# Patient Record
Sex: FEMALE | NOT HISPANIC OR LATINO | Employment: UNEMPLOYED | URBAN - METROPOLITAN AREA
[De-identification: names, ages, dates, MRNs, and addresses within clinical notes are randomized per-mention and may not be internally consistent; named-entity substitution may affect disease eponyms.]

---

## 2022-11-25 ENCOUNTER — ANESTHESIA (OUTPATIENT)
Dept: SURGERY | Facility: CLINIC | Age: 32
End: 2022-11-25
Payer: COMMERCIAL

## 2022-11-25 ENCOUNTER — ANESTHESIA EVENT (OUTPATIENT)
Dept: SURGERY | Facility: CLINIC | Age: 32
End: 2022-11-25
Payer: COMMERCIAL

## 2022-11-25 ENCOUNTER — HOSPITAL ENCOUNTER (INPATIENT)
Facility: CLINIC | Age: 32
LOS: 4 days | Discharge: HOME | End: 2022-11-29
Attending: STUDENT IN AN ORGANIZED HEALTH CARE EDUCATION/TRAINING PROGRAM | Admitting: STUDENT IN AN ORGANIZED HEALTH CARE EDUCATION/TRAINING PROGRAM
Payer: COMMERCIAL

## 2022-11-25 DIAGNOSIS — O44.03 PLACENTA PREVIA IN THIRD TRIMESTER: Primary | ICD-10-CM

## 2022-11-25 PROBLEM — O44.00 PLACENTA PREVIA: Status: ACTIVE | Noted: 2022-11-25

## 2022-11-25 LAB
ABO/RH(D): NORMAL
AMPHETAMINES UR QL SCN: ABNORMAL
AMPHETAMINES UR QL SCN: ABNORMAL
ANTIBODY SCREEN: NEGATIVE
BARBITURATES UR QL: ABNORMAL
BENZODIAZ UR QL: ABNORMAL
CANNABINOIDS UR QL SCN: ABNORMAL
CANNABINOIDS UR QL SCN: ABNORMAL
COCAINE UR QL: ABNORMAL
COCAINE UR QL: ABNORMAL
CREAT UR-MCNC: 178 MG/DL
ERYTHROCYTE [DISTWIDTH] IN BLOOD BY AUTOMATED COUNT: 12.8 % (ref 10–15)
HCT VFR BLD AUTO: 29.2 % (ref 35–47)
HGB BLD-MCNC: 9.6 G/DL (ref 11.7–15.7)
MCH RBC QN AUTO: 27.7 PG (ref 26.5–33)
MCHC RBC AUTO-ENTMCNC: 32.9 G/DL (ref 31.5–36.5)
MCV RBC AUTO: 84 FL (ref 78–100)
OPIATES UR QL SCN: ABNORMAL
OPIATES UR QL SCN: ABNORMAL
PCP UR QL SCN: ABNORMAL
PCP UR QL SCN: ABNORMAL
PLATELET # BLD AUTO: 191 10E3/UL (ref 150–450)
RBC # BLD AUTO: 3.46 10E6/UL (ref 3.8–5.2)
SARS-COV-2 RNA RESP QL NAA+PROBE: NEGATIVE
SPECIMEN EXPIRATION DATE: NORMAL
T PALLIDUM AB SER QL: NONREACTIVE
WBC # BLD AUTO: 7.5 10E3/UL (ref 4–11)

## 2022-11-25 PROCEDURE — 59025 FETAL NON-STRESS TEST: CPT

## 2022-11-25 PROCEDURE — 250N000011 HC RX IP 250 OP 636

## 2022-11-25 PROCEDURE — 370N000017 HC ANESTHESIA TECHNICAL FEE, PER MIN: Performed by: STUDENT IN AN ORGANIZED HEALTH CARE EDUCATION/TRAINING PROGRAM

## 2022-11-25 PROCEDURE — 86901 BLOOD TYPING SEROLOGIC RH(D): CPT | Performed by: STUDENT IN AN ORGANIZED HEALTH CARE EDUCATION/TRAINING PROGRAM

## 2022-11-25 PROCEDURE — 250N000011 HC RX IP 250 OP 636: Performed by: STUDENT IN AN ORGANIZED HEALTH CARE EDUCATION/TRAINING PROGRAM

## 2022-11-25 PROCEDURE — U0003 INFECTIOUS AGENT DETECTION BY NUCLEIC ACID (DNA OR RNA); SEVERE ACUTE RESPIRATORY SYNDROME CORONAVIRUS 2 (SARS-COV-2) (CORONAVIRUS DISEASE [COVID-19]), AMPLIFIED PROBE TECHNIQUE, MAKING USE OF HIGH THROUGHPUT TECHNOLOGIES AS DESCRIBED BY CMS-2020-01-R: HCPCS | Performed by: STUDENT IN AN ORGANIZED HEALTH CARE EDUCATION/TRAINING PROGRAM

## 2022-11-25 PROCEDURE — 360N000076 HC SURGERY LEVEL 3, PER MIN: Performed by: STUDENT IN AN ORGANIZED HEALTH CARE EDUCATION/TRAINING PROGRAM

## 2022-11-25 PROCEDURE — G0463 HOSPITAL OUTPT CLINIC VISIT: HCPCS | Mod: 25

## 2022-11-25 PROCEDURE — 86780 TREPONEMA PALLIDUM: CPT | Performed by: STUDENT IN AN ORGANIZED HEALTH CARE EDUCATION/TRAINING PROGRAM

## 2022-11-25 PROCEDURE — 80349 CANNABINOIDS NATURAL: CPT | Performed by: STUDENT IN AN ORGANIZED HEALTH CARE EDUCATION/TRAINING PROGRAM

## 2022-11-25 PROCEDURE — 250N000013 HC RX MED GY IP 250 OP 250 PS 637: Performed by: STUDENT IN AN ORGANIZED HEALTH CARE EDUCATION/TRAINING PROGRAM

## 2022-11-25 PROCEDURE — 250N000011 HC RX IP 250 OP 636: Performed by: ANESTHESIOLOGY

## 2022-11-25 PROCEDURE — 85027 COMPLETE CBC AUTOMATED: CPT | Performed by: STUDENT IN AN ORGANIZED HEALTH CARE EDUCATION/TRAINING PROGRAM

## 2022-11-25 PROCEDURE — 258N000003 HC RX IP 258 OP 636

## 2022-11-25 PROCEDURE — 250N000009 HC RX 250: Performed by: STUDENT IN AN ORGANIZED HEALTH CARE EDUCATION/TRAINING PROGRAM

## 2022-11-25 PROCEDURE — 272N000001 HC OR GENERAL SUPPLY STERILE: Performed by: STUDENT IN AN ORGANIZED HEALTH CARE EDUCATION/TRAINING PROGRAM

## 2022-11-25 PROCEDURE — 250N000013 HC RX MED GY IP 250 OP 250 PS 637

## 2022-11-25 PROCEDURE — 999N000141 HC STATISTIC PRE-PROCEDURE NURSING ASSESSMENT: Performed by: STUDENT IN AN ORGANIZED HEALTH CARE EDUCATION/TRAINING PROGRAM

## 2022-11-25 PROCEDURE — 710N000009 HC RECOVERY PHASE 1, LEVEL 1, PER MIN: Performed by: STUDENT IN AN ORGANIZED HEALTH CARE EDUCATION/TRAINING PROGRAM

## 2022-11-25 PROCEDURE — 80307 DRUG TEST PRSMV CHEM ANLYZR: CPT | Performed by: STUDENT IN AN ORGANIZED HEALTH CARE EDUCATION/TRAINING PROGRAM

## 2022-11-25 PROCEDURE — 258N000003 HC RX IP 258 OP 636: Performed by: STUDENT IN AN ORGANIZED HEALTH CARE EDUCATION/TRAINING PROGRAM

## 2022-11-25 PROCEDURE — 258N000003 HC RX IP 258 OP 636: Performed by: OBSTETRICS & GYNECOLOGY

## 2022-11-25 PROCEDURE — 120N000012 HC R&B POSTPARTUM

## 2022-11-25 PROCEDURE — 250N000009 HC RX 250

## 2022-11-25 RX ORDER — ONDANSETRON 2 MG/ML
4 INJECTION INTRAMUSCULAR; INTRAVENOUS EVERY 6 HOURS PRN
Status: DISCONTINUED | OUTPATIENT
Start: 2022-11-25 | End: 2022-11-25 | Stop reason: HOSPADM

## 2022-11-25 RX ORDER — OXYTOCIN 10 [USP'U]/ML
10 INJECTION, SOLUTION INTRAMUSCULAR; INTRAVENOUS
Status: DISCONTINUED | OUTPATIENT
Start: 2022-11-25 | End: 2022-11-25 | Stop reason: HOSPADM

## 2022-11-25 RX ORDER — ACETAMINOPHEN 325 MG/1
975 TABLET ORAL EVERY 6 HOURS
Status: DISCONTINUED | OUTPATIENT
Start: 2022-11-25 | End: 2022-11-29 | Stop reason: HOSPADM

## 2022-11-25 RX ORDER — MISOPROSTOL 200 UG/1
800 TABLET ORAL
Status: DISCONTINUED | OUTPATIENT
Start: 2022-11-25 | End: 2022-11-25 | Stop reason: HOSPADM

## 2022-11-25 RX ORDER — BISACODYL 10 MG
10 SUPPOSITORY, RECTAL RECTAL DAILY PRN
Status: DISCONTINUED | OUTPATIENT
Start: 2022-11-27 | End: 2022-11-29 | Stop reason: HOSPADM

## 2022-11-25 RX ORDER — MISOPROSTOL 200 UG/1
400 TABLET ORAL
Status: DISCONTINUED | OUTPATIENT
Start: 2022-11-25 | End: 2022-11-25 | Stop reason: HOSPADM

## 2022-11-25 RX ORDER — NALOXONE HYDROCHLORIDE 0.4 MG/ML
0.2 INJECTION, SOLUTION INTRAMUSCULAR; INTRAVENOUS; SUBCUTANEOUS
Status: DISCONTINUED | OUTPATIENT
Start: 2022-11-25 | End: 2022-11-29 | Stop reason: HOSPADM

## 2022-11-25 RX ORDER — NALOXONE HYDROCHLORIDE 0.4 MG/ML
0.4 INJECTION, SOLUTION INTRAMUSCULAR; INTRAVENOUS; SUBCUTANEOUS
Status: DISCONTINUED | OUTPATIENT
Start: 2022-11-25 | End: 2022-11-29 | Stop reason: HOSPADM

## 2022-11-25 RX ORDER — SIMETHICONE 80 MG
80 TABLET,CHEWABLE ORAL 4 TIMES DAILY PRN
Status: DISCONTINUED | OUTPATIENT
Start: 2022-11-25 | End: 2022-11-29 | Stop reason: HOSPADM

## 2022-11-25 RX ORDER — METOCLOPRAMIDE HYDROCHLORIDE 5 MG/ML
10 INJECTION INTRAMUSCULAR; INTRAVENOUS EVERY 6 HOURS PRN
Status: DISCONTINUED | OUTPATIENT
Start: 2022-11-25 | End: 2022-11-25 | Stop reason: HOSPADM

## 2022-11-25 RX ORDER — ONDANSETRON 2 MG/ML
4 INJECTION INTRAMUSCULAR; INTRAVENOUS EVERY 6 HOURS PRN
Status: DISCONTINUED | OUTPATIENT
Start: 2022-11-25 | End: 2022-11-29 | Stop reason: HOSPADM

## 2022-11-25 RX ORDER — ONDANSETRON 4 MG/1
4 TABLET, ORALLY DISINTEGRATING ORAL EVERY 6 HOURS PRN
Status: DISCONTINUED | OUTPATIENT
Start: 2022-11-25 | End: 2022-11-29 | Stop reason: HOSPADM

## 2022-11-25 RX ORDER — NALBUPHINE HYDROCHLORIDE 10 MG/ML
2.5-5 INJECTION, SOLUTION INTRAMUSCULAR; INTRAVENOUS; SUBCUTANEOUS EVERY 6 HOURS PRN
Status: DISCONTINUED | OUTPATIENT
Start: 2022-11-25 | End: 2022-11-29 | Stop reason: HOSPADM

## 2022-11-25 RX ORDER — CEFAZOLIN SODIUM/WATER 2 G/20 ML
2 SYRINGE (ML) INTRAVENOUS SEE ADMIN INSTRUCTIONS
Status: DISCONTINUED | OUTPATIENT
Start: 2022-11-25 | End: 2022-11-25 | Stop reason: HOSPADM

## 2022-11-25 RX ORDER — OXYTOCIN/0.9 % SODIUM CHLORIDE 30/500 ML
340 PLASTIC BAG, INJECTION (ML) INTRAVENOUS CONTINUOUS PRN
Status: COMPLETED | OUTPATIENT
Start: 2022-11-25 | End: 2022-11-25

## 2022-11-25 RX ORDER — ACETAMINOPHEN 325 MG/1
975 TABLET ORAL ONCE
Status: COMPLETED | OUTPATIENT
Start: 2022-11-25 | End: 2022-11-25

## 2022-11-25 RX ORDER — AMOXICILLIN 250 MG
2 CAPSULE ORAL 2 TIMES DAILY
Status: DISCONTINUED | OUTPATIENT
Start: 2022-11-25 | End: 2022-11-29 | Stop reason: HOSPADM

## 2022-11-25 RX ORDER — ONDANSETRON 4 MG/1
4 TABLET, ORALLY DISINTEGRATING ORAL EVERY 6 HOURS PRN
Status: DISCONTINUED | OUTPATIENT
Start: 2022-11-25 | End: 2022-11-25 | Stop reason: HOSPADM

## 2022-11-25 RX ORDER — MODIFIED LANOLIN
OINTMENT (GRAM) TOPICAL
Status: DISCONTINUED | OUTPATIENT
Start: 2022-11-25 | End: 2022-11-29 | Stop reason: HOSPADM

## 2022-11-25 RX ORDER — LIDOCAINE 40 MG/G
CREAM TOPICAL
Status: DISCONTINUED | OUTPATIENT
Start: 2022-11-25 | End: 2022-11-29 | Stop reason: HOSPADM

## 2022-11-25 RX ORDER — AMOXICILLIN 250 MG
1 CAPSULE ORAL 2 TIMES DAILY
Status: DISCONTINUED | OUTPATIENT
Start: 2022-11-25 | End: 2022-11-29 | Stop reason: HOSPADM

## 2022-11-25 RX ORDER — PROCHLORPERAZINE 25 MG
25 SUPPOSITORY, RECTAL RECTAL EVERY 12 HOURS PRN
Status: DISCONTINUED | OUTPATIENT
Start: 2022-11-25 | End: 2022-11-29 | Stop reason: HOSPADM

## 2022-11-25 RX ORDER — CARBOPROST TROMETHAMINE 250 UG/ML
250 INJECTION, SOLUTION INTRAMUSCULAR
Status: DISCONTINUED | OUTPATIENT
Start: 2022-11-25 | End: 2022-11-29 | Stop reason: HOSPADM

## 2022-11-25 RX ORDER — OXYTOCIN 10 [USP'U]/ML
10 INJECTION, SOLUTION INTRAMUSCULAR; INTRAVENOUS
Status: DISCONTINUED | OUTPATIENT
Start: 2022-11-25 | End: 2022-11-28

## 2022-11-25 RX ORDER — LIDOCAINE 40 MG/G
CREAM TOPICAL
Status: DISCONTINUED | OUTPATIENT
Start: 2022-11-25 | End: 2022-11-25 | Stop reason: HOSPADM

## 2022-11-25 RX ORDER — BUPIVACAINE HYDROCHLORIDE 7.5 MG/ML
INJECTION, SOLUTION INTRASPINAL
Status: COMPLETED | OUTPATIENT
Start: 2022-11-25 | End: 2022-11-25

## 2022-11-25 RX ORDER — OXYCODONE HYDROCHLORIDE 5 MG/1
5 TABLET ORAL EVERY 4 HOURS PRN
Status: DISCONTINUED | OUTPATIENT
Start: 2022-11-25 | End: 2022-11-29 | Stop reason: HOSPADM

## 2022-11-25 RX ORDER — SODIUM CHLORIDE, SODIUM LACTATE, POTASSIUM CHLORIDE, CALCIUM CHLORIDE 600; 310; 30; 20 MG/100ML; MG/100ML; MG/100ML; MG/100ML
INJECTION, SOLUTION INTRAVENOUS CONTINUOUS
Status: DISCONTINUED | OUTPATIENT
Start: 2022-11-25 | End: 2022-11-25 | Stop reason: HOSPADM

## 2022-11-25 RX ORDER — PROCHLORPERAZINE 25 MG
25 SUPPOSITORY, RECTAL RECTAL EVERY 12 HOURS PRN
Status: DISCONTINUED | OUTPATIENT
Start: 2022-11-25 | End: 2022-11-25 | Stop reason: HOSPADM

## 2022-11-25 RX ORDER — KETOROLAC TROMETHAMINE 30 MG/ML
30 INJECTION, SOLUTION INTRAMUSCULAR; INTRAVENOUS EVERY 6 HOURS
Status: COMPLETED | OUTPATIENT
Start: 2022-11-25 | End: 2022-11-26

## 2022-11-25 RX ORDER — ONDANSETRON 2 MG/ML
INJECTION INTRAMUSCULAR; INTRAVENOUS PRN
Status: DISCONTINUED | OUTPATIENT
Start: 2022-11-25 | End: 2022-11-25

## 2022-11-25 RX ORDER — DEXTROSE, SODIUM CHLORIDE, SODIUM LACTATE, POTASSIUM CHLORIDE, AND CALCIUM CHLORIDE 5; .6; .31; .03; .02 G/100ML; G/100ML; G/100ML; G/100ML; G/100ML
INJECTION, SOLUTION INTRAVENOUS CONTINUOUS
Status: DISCONTINUED | OUTPATIENT
Start: 2022-11-25 | End: 2022-11-29 | Stop reason: HOSPADM

## 2022-11-25 RX ORDER — OXYTOCIN/0.9 % SODIUM CHLORIDE 30/500 ML
100-340 PLASTIC BAG, INJECTION (ML) INTRAVENOUS CONTINUOUS PRN
Status: DISCONTINUED | OUTPATIENT
Start: 2022-11-25 | End: 2022-11-28

## 2022-11-25 RX ORDER — IBUPROFEN 400 MG/1
800 TABLET, FILM COATED ORAL EVERY 6 HOURS
Status: DISCONTINUED | OUTPATIENT
Start: 2022-11-26 | End: 2022-11-29 | Stop reason: HOSPADM

## 2022-11-25 RX ORDER — TRANEXAMIC ACID 10 MG/ML
1 INJECTION, SOLUTION INTRAVENOUS EVERY 30 MIN PRN
Status: DISCONTINUED | OUTPATIENT
Start: 2022-11-25 | End: 2022-11-25 | Stop reason: HOSPADM

## 2022-11-25 RX ORDER — CEFAZOLIN SODIUM/WATER 2 G/20 ML
2 SYRINGE (ML) INTRAVENOUS
Status: COMPLETED | OUTPATIENT
Start: 2022-11-25 | End: 2022-11-25

## 2022-11-25 RX ORDER — SODIUM CHLORIDE 9 MG/ML
INJECTION, SOLUTION INTRAVENOUS CONTINUOUS PRN
Status: DISCONTINUED | OUTPATIENT
Start: 2022-11-25 | End: 2022-11-25

## 2022-11-25 RX ORDER — PROCHLORPERAZINE MALEATE 5 MG
10 TABLET ORAL EVERY 6 HOURS PRN
Status: DISCONTINUED | OUTPATIENT
Start: 2022-11-25 | End: 2022-11-25 | Stop reason: HOSPADM

## 2022-11-25 RX ORDER — EPHEDRINE SULFATE 50 MG/ML
5 INJECTION, SOLUTION INTRAMUSCULAR; INTRAVENOUS; SUBCUTANEOUS
Status: DISCONTINUED | OUTPATIENT
Start: 2022-11-25 | End: 2022-11-25 | Stop reason: HOSPADM

## 2022-11-25 RX ORDER — FENTANYL CITRATE 50 UG/ML
INJECTION, SOLUTION INTRAMUSCULAR; INTRAVENOUS PRN
Status: DISCONTINUED | OUTPATIENT
Start: 2022-11-25 | End: 2022-11-25

## 2022-11-25 RX ORDER — PROCHLORPERAZINE MALEATE 10 MG
10 TABLET ORAL EVERY 6 HOURS PRN
Status: DISCONTINUED | OUTPATIENT
Start: 2022-11-25 | End: 2022-11-29 | Stop reason: HOSPADM

## 2022-11-25 RX ORDER — MISOPROSTOL 200 UG/1
400 TABLET ORAL
Status: DISCONTINUED | OUTPATIENT
Start: 2022-11-25 | End: 2022-11-29 | Stop reason: HOSPADM

## 2022-11-25 RX ORDER — ACETAMINOPHEN 325 MG/1
TABLET ORAL
Status: COMPLETED
Start: 2022-11-25 | End: 2022-11-25

## 2022-11-25 RX ORDER — HYDROCORTISONE 25 MG/G
CREAM TOPICAL 3 TIMES DAILY PRN
Status: DISCONTINUED | OUTPATIENT
Start: 2022-11-25 | End: 2022-11-29 | Stop reason: HOSPADM

## 2022-11-25 RX ORDER — CITRIC ACID/SODIUM CITRATE 334-500MG
SOLUTION, ORAL ORAL
Status: COMPLETED
Start: 2022-11-25 | End: 2022-11-25

## 2022-11-25 RX ORDER — LIDOCAINE HYDROCHLORIDE 20 MG/ML
INJECTION, SOLUTION INFILTRATION; PERINEURAL PRN
Status: DISCONTINUED | OUTPATIENT
Start: 2022-11-25 | End: 2022-11-25

## 2022-11-25 RX ORDER — CITRIC ACID/SODIUM CITRATE 334-500MG
30 SOLUTION, ORAL ORAL
Status: COMPLETED | OUTPATIENT
Start: 2022-11-25 | End: 2022-11-25

## 2022-11-25 RX ORDER — CARBOPROST TROMETHAMINE 250 UG/ML
250 INJECTION, SOLUTION INTRAMUSCULAR
Status: DISCONTINUED | OUTPATIENT
Start: 2022-11-25 | End: 2022-11-25 | Stop reason: HOSPADM

## 2022-11-25 RX ORDER — MISOPROSTOL 200 UG/1
800 TABLET ORAL
Status: DISCONTINUED | OUTPATIENT
Start: 2022-11-25 | End: 2022-11-29 | Stop reason: HOSPADM

## 2022-11-25 RX ORDER — TRANEXAMIC ACID 10 MG/ML
1 INJECTION, SOLUTION INTRAVENOUS EVERY 30 MIN PRN
Status: DISCONTINUED | OUTPATIENT
Start: 2022-11-25 | End: 2022-11-29 | Stop reason: HOSPADM

## 2022-11-25 RX ORDER — METOCLOPRAMIDE HYDROCHLORIDE 5 MG/ML
10 INJECTION INTRAMUSCULAR; INTRAVENOUS EVERY 6 HOURS PRN
Status: DISCONTINUED | OUTPATIENT
Start: 2022-11-25 | End: 2022-11-29 | Stop reason: HOSPADM

## 2022-11-25 RX ORDER — METHYLERGONOVINE MALEATE 0.2 MG/ML
200 INJECTION INTRAVENOUS
Status: DISCONTINUED | OUTPATIENT
Start: 2022-11-25 | End: 2022-11-29 | Stop reason: HOSPADM

## 2022-11-25 RX ORDER — CEFAZOLIN SODIUM/WATER 2 G/20 ML
SYRINGE (ML) INTRAVENOUS
Status: DISCONTINUED
Start: 2022-11-25 | End: 2022-11-25 | Stop reason: HOSPADM

## 2022-11-25 RX ORDER — KETOROLAC TROMETHAMINE 30 MG/ML
INJECTION, SOLUTION INTRAMUSCULAR; INTRAVENOUS PRN
Status: DISCONTINUED | OUTPATIENT
Start: 2022-11-25 | End: 2022-11-25

## 2022-11-25 RX ORDER — OXYTOCIN 10 [USP'U]/ML
10 INJECTION, SOLUTION INTRAMUSCULAR; INTRAVENOUS
Status: DISCONTINUED | OUTPATIENT
Start: 2022-11-25 | End: 2022-11-29 | Stop reason: HOSPADM

## 2022-11-25 RX ORDER — METOCLOPRAMIDE 10 MG/1
10 TABLET ORAL EVERY 6 HOURS PRN
Status: DISCONTINUED | OUTPATIENT
Start: 2022-11-25 | End: 2022-11-29 | Stop reason: HOSPADM

## 2022-11-25 RX ORDER — MORPHINE SULFATE 1 MG/ML
INJECTION, SOLUTION EPIDURAL; INTRATHECAL; INTRAVENOUS PRN
Status: DISCONTINUED | OUTPATIENT
Start: 2022-11-25 | End: 2022-11-25

## 2022-11-25 RX ORDER — OXYTOCIN/0.9 % SODIUM CHLORIDE 30/500 ML
340 PLASTIC BAG, INJECTION (ML) INTRAVENOUS CONTINUOUS PRN
Status: DISCONTINUED | OUTPATIENT
Start: 2022-11-25 | End: 2022-11-29 | Stop reason: HOSPADM

## 2022-11-25 RX ORDER — DEXMEDETOMIDINE HYDROCHLORIDE 4 UG/ML
INJECTION, SOLUTION INTRAVENOUS PRN
Status: DISCONTINUED | OUTPATIENT
Start: 2022-11-25 | End: 2022-11-25

## 2022-11-25 RX ORDER — METHYLERGONOVINE MALEATE 0.2 MG/ML
200 INJECTION INTRAVENOUS
Status: DISCONTINUED | OUTPATIENT
Start: 2022-11-25 | End: 2022-11-25 | Stop reason: HOSPADM

## 2022-11-25 RX ORDER — METOCLOPRAMIDE 10 MG/1
10 TABLET ORAL EVERY 6 HOURS PRN
Status: DISCONTINUED | OUTPATIENT
Start: 2022-11-25 | End: 2022-11-25 | Stop reason: HOSPADM

## 2022-11-25 RX ADMIN — NALBUPHINE HYDROCHLORIDE 2.5 MG: 10 INJECTION, SOLUTION INTRAMUSCULAR; INTRAVENOUS; SUBCUTANEOUS at 15:36

## 2022-11-25 RX ADMIN — LIDOCAINE HYDROCHLORIDE 40 MG: 20 INJECTION, SOLUTION INFILTRATION; PERINEURAL at 06:14

## 2022-11-25 RX ADMIN — SENNOSIDES AND DOCUSATE SODIUM 1 TABLET: 50; 8.6 TABLET ORAL at 20:16

## 2022-11-25 RX ADMIN — ACETAMINOPHEN 975 MG: 325 TABLET ORAL at 05:30

## 2022-11-25 RX ADMIN — DEXMEDETOMIDINE HYDROCHLORIDE 4 MCG: 200 INJECTION INTRAVENOUS at 06:14

## 2022-11-25 RX ADMIN — ACETAMINOPHEN 975 MG: 325 TABLET, FILM COATED ORAL at 20:16

## 2022-11-25 RX ADMIN — PHENYLEPHRINE HYDROCHLORIDE 200 MCG: 10 INJECTION INTRAVENOUS at 06:34

## 2022-11-25 RX ADMIN — BUPIVACAINE HYDROCHLORIDE IN DEXTROSE 1.6 ML: 7.5 INJECTION, SOLUTION SUBARACHNOID at 05:45

## 2022-11-25 RX ADMIN — SODIUM CHLORIDE: 9 INJECTION, SOLUTION INTRAVENOUS at 05:54

## 2022-11-25 RX ADMIN — Medication 30 ML: at 05:31

## 2022-11-25 RX ADMIN — PHENYLEPHRINE HYDROCHLORIDE 0.7 MCG/KG/MIN: 10 INJECTION INTRAVENOUS at 05:46

## 2022-11-25 RX ADMIN — ACETAMINOPHEN 975 MG: 325 TABLET, FILM COATED ORAL at 05:30

## 2022-11-25 RX ADMIN — ONDANSETRON 4 MG: 2 INJECTION INTRAMUSCULAR; INTRAVENOUS at 06:12

## 2022-11-25 RX ADMIN — MORPHINE SULFATE 0.15 MG: 1 INJECTION, SOLUTION EPIDURAL; INTRATHECAL; INTRAVENOUS at 05:49

## 2022-11-25 RX ADMIN — KETOROLAC TROMETHAMINE 30 MG: 30 INJECTION, SOLUTION INTRAMUSCULAR; INTRAVENOUS at 20:16

## 2022-11-25 RX ADMIN — PHENYLEPHRINE HYDROCHLORIDE 200 MCG: 10 INJECTION INTRAVENOUS at 06:28

## 2022-11-25 RX ADMIN — Medication 2 G: at 05:46

## 2022-11-25 RX ADMIN — DEXMEDETOMIDINE HYDROCHLORIDE 8 MCG: 200 INJECTION INTRAVENOUS at 06:12

## 2022-11-25 RX ADMIN — ACETAMINOPHEN 975 MG: 325 TABLET, FILM COATED ORAL at 11:53

## 2022-11-25 RX ADMIN — SODIUM CHLORIDE, POTASSIUM CHLORIDE, SODIUM LACTATE AND CALCIUM CHLORIDE 1000 ML: 600; 310; 30; 20 INJECTION, SOLUTION INTRAVENOUS at 15:45

## 2022-11-25 RX ADMIN — KETOROLAC TROMETHAMINE 30 MG: 30 INJECTION, SOLUTION INTRAMUSCULAR; INTRAVENOUS at 13:51

## 2022-11-25 RX ADMIN — SODIUM CITRATE AND CITRIC ACID MONOHYDRATE 30 ML: 500; 334 SOLUTION ORAL at 05:31

## 2022-11-25 RX ADMIN — FENTANYL CITRATE 50 MCG: 50 INJECTION, SOLUTION INTRAMUSCULAR; INTRAVENOUS at 06:40

## 2022-11-25 RX ADMIN — SODIUM CHLORIDE, SODIUM LACTATE, POTASSIUM CHLORIDE, CALCIUM CHLORIDE AND DEXTROSE MONOHYDRATE 125 ML/HR: 5; 600; 310; 30; 20 INJECTION, SOLUTION INTRAVENOUS at 09:34

## 2022-11-25 RX ADMIN — Medication 340 ML/HR: at 06:09

## 2022-11-25 RX ADMIN — KETOROLAC TROMETHAMINE 30 MG: 30 INJECTION, SOLUTION INTRAMUSCULAR at 06:39

## 2022-11-25 RX ADMIN — SODIUM CHLORIDE, POTASSIUM CHLORIDE, SODIUM LACTATE AND CALCIUM CHLORIDE: 600; 310; 30; 20 INJECTION, SOLUTION INTRAVENOUS at 05:48

## 2022-11-25 ASSESSMENT — ACTIVITIES OF DAILY LIVING (ADL)
ADLS_ACUITY_SCORE: 31

## 2022-11-25 NOTE — OP NOTE
Section Operative Note    Mackenzie Nino   : 1990     Date of service/delivery: 22   Place of delivery: [facility]    Pre-operative Diagnosis:  at 35w6d                                            Vaginal bleeding, contractions          Complete placenta previa     Post-operative Diagnosis: Same, delivered    Procedure done: Primary LTCS    Surgeon: Dea Hubbard MD    Assistant: None    Anesthesia: Spinal     Complications:  None    QBL: 445mL    IV fluids: crystalloid    Drains: rodríguez catheter draining clear, yellow urine    Findings:  Viable female infant in cephalic presentation   Weight 2430g  APGARS- 8/7  3-V cord  Normal appearing uterus, fallopian tubes and ovaries.      Indications:   Patient is a  at 35w6d who was admitted for vaginal bleeding and contractions in context of known placenta previa.  Baby is known to be in the cephalic position based on Leopold's maneuvers. Risks, benefits, and alternatives to the procedure were discussed and all questions were answered to the patient's satisfaction. Consent was reviewed and signed with a witness present. NICU team was called and present for delivery due to  status.    Procedure Details:  IV antibiotics given per protocol.  SCD placed for VTE prophylaxis.  Spinal anesthesia administered, checked and found to be adequate.  Fetal heart tones obtained and reassuring.  Rodríguez catheter was in place.    The patient was draped and prepped in the usual sterile manner in the dorsal supine position with a left lateral tilt.  After a time-out was performed with all members of the surgical team in agreement, a Pfannenstiel incision was made with a scalpel and carried down through the subcutaneous tissue to the fascia. The fascia was then nicked on either side of the midline using a scalpel, and the facial incision was extended laterally using curved Peres scissors.  The fascia was then grasped with kocher clamps and   from the underlying rectus tissue superiorly and inferiorly using both blunt and sharp dissection.     The peritoneum was identified and entered bluntly, and stretched laterally for adequate visualization of abdominal structures.  Peritoneal incision was extended with careful visualization of bladder.  The bladder blade was inserted.  The utero-vesical peritoneal reflection was opened sharply and extended laterally.  The bladder blade was then replaced to displace the bladder from the surgical field.  At this time, the uterus was visualized and hand was placed into the abdomen to palpate fetal lie and determine the level of the fetal head.     A transverse incision made in the lower uterine segment above the bladder using a scalpel until fetal membranes were visualized.  The membranes were ruptured for clear fluid.  A hand was placed through the hysterotomy for elevation of the fetal head, and the infant was delivered in cephlaic presentation.  Nose and mouth were bulb-suctioned, and the cord was clamped and cut and the infant was handed off to nursing staff for further assessment.  Cord blood was sampled.  The placenta was next delivered without difficulty using gentle manual traction on the cord.  Placenta was noted to be primarily located posteriorly, with only the leading edge over the cervix. No evidence of accreta and bleeding minimal following removal.     At this time, the uterus was exteriorized and any remaining membranes were swept from the uterine wall using a laparotomy pad.  The uterine incision was next closed with running locked sutures of 0 Vicryl on a CTX needle. A second imbricating layer was then placed for reinforcement and for better hemostasis.  At this time, excellent hemostasis was observed. The uterus was placed back into the abdomen.  Para-colic gutters were cleared of all clots and debris.  The hysterotomy once again checked to ascertain hemostasis.       The fascia was closed  with running sutures of 0 Vicryl on a CT-1 needle. The skin was closed in a subcuticular fashion with 4-0 monocryl.  Dermabond was then placed over the incision.  At this time, the case was completed.  After frog-legging the patient at the completion of the procedure, firm fundal pressure was applied and uterine clot was evacuated, with the uterus firm at the umbilicus.      Instrument, sponge, and needle counts were correct x 3     The mother was brought back to the room in stable condition and baby was taken to the NICU.     Dea Hubbard MD

## 2022-11-25 NOTE — PLAN OF CARE
Data: Mackenzie Nino transferred to [id number] via bed at 0915 after visiting baby in NICU.   Action: Receiving unit notified of transfer: Yes. Patient and family notified of room change. Report given to Ester PENA RN at 0915. Belongings sent to receiving unit. Accompanied by Registered Nurse. Oriented patient to surroundings. Call light within reach. ID bands double-checked with receiving RN.  Response: Patient tolerated transfer and is stable.

## 2022-11-25 NOTE — ANESTHESIA CARE TRANSFER NOTE
Patient: Mackenzie Nino    Procedure: Procedure(s):   SECTION       Diagnosis: * No pre-op diagnosis entered *  Diagnosis Additional Information: No value filed.    Anesthesia Type:   Spinal     Note:    Oropharynx: oropharynx clear of all foreign objects and spontaneously breathing  Level of Consciousness: awake  Oxygen Supplementation: room air    Independent Airway: airway patency satisfactory and stable  Dentition: dentition unchanged  Vital Signs Stable: post-procedure vital signs reviewed and stable  Report to RN Given: handoff report given  Patient transferred to: Labor and Delivery    Handoff Report: Identifed the Patient, Identified the Reponsible Provider, Reviewed the pertinent medical history, Discussed the surgical course, Reviewed Intra-OP anesthesia mangement and issues during anesthesia, Set expectations for post-procedure period and Allowed opportunity for questions and acknowledgement of understanding      Vitals:  Vitals Value Taken Time   /56    Temp     Pulse 79 22 0701   Resp 24 22 0701   SpO2 99 % 22 0701   Vitals shown include unvalidated device data.    Electronically Signed By: OTILIA Thacker CRNA  2022  7:03 AM

## 2022-11-25 NOTE — ANESTHESIA POSTPROCEDURE EVALUATION
Patient: Mackenzie Nino    Procedure: Procedure(s):   SECTION       Anesthesia Type:  Spinal    Note:  Disposition: Inpatient   Postop Pain Control: Uneventful            Sign Out: Well controlled pain   PONV: No   Neuro/Psych: Uneventful            Sign Out: Acceptable/Baseline neuro status   Airway/Respiratory: Uneventful            Sign Out: Acceptable/Baseline resp. status   CV/Hemodynamics: Uneventful            Sign Out: Acceptable CV status   Other NRE:    DID A NON-ROUTINE EVENT OCCUR? No           Last vitals:  Vitals Value Taken Time   /60 2248   Temp 36.5  C (97.7  F) 2230   Pulse 61 22   Resp 12 22   SpO2 99 % 22       Electronically Signed By: Ariela Sweeney  2022  7:49 AM

## 2022-11-25 NOTE — PROVIDER NOTIFICATION
11/25/22 0443   Provider Notification   Provider Name/Title Dr Hubbard   Method of Notification Phone   Notification Reason Status Update   Provider made aware of patient arrival, known previa with vaginal bleeding. Gestational age with previous bleed discussed. Patient having and now reporting feeling contractions. Per provider to proceed with c section as soon as provider arrives. She is about 20 minutes out. Orders received and entered by RN. To prepare for surgery.

## 2022-11-25 NOTE — PROVIDER NOTIFICATION
11/25/22 1527   Provider Notification   Provider Name/Title Dr. Haydee TIPTON paged for low urine output. Only 70 mls out since catheter put in.

## 2022-11-25 NOTE — ANESTHESIA PREPROCEDURE EVALUATION
Anesthesia Pre-Procedure Evaluation    Patient: Mackenzie Nino   MRN: [id number] : 1990        Procedure : Procedure(s):   SECTION          History reviewed. No pertinent past medical history.   History reviewed. No pertinent surgical history.   No Known Allergies   Social History     Tobacco Use     Smoking status: Never     Smokeless tobacco: Never   Substance Use Topics     Alcohol use: Not Currently      Wt Readings from Last 1 Encounters:   22 52.2 kg (115 lb)        Anesthesia Evaluation            ROS/MED HX  ENT/Pulmonary:       Neurologic:       Cardiovascular:       METS/Exercise Tolerance:     Hematologic:       Musculoskeletal:       GI/Hepatic:     (+) GERD,     Renal/Genitourinary:       Endo:       Psychiatric/Substance Use:       Infectious Disease:       Malignancy:       Other:      (+) , placenta previa            OUTSIDE LABS:  CBC:   Lab Results   Component Value Date    WBC 7.5 2022    HGB 9.6 (L) 2022    HCT 29.2 (L) 2022     2022     BMP: No results found for: NA, POTASSIUM, CHLORIDE, CO2, BUN, CR, GLC  COAGS: No results found for: PTT, INR, FIBR  POC: No results found for: BGM, HCG, HCGS  HEPATIC: No results found for: ALBUMIN, PROTTOTAL, ALT, AST, GGT, ALKPHOS, BILITOTAL, BILIDIRECT, THANH  OTHER: No results found for: PH, LACT, A1C, BORIS, PHOS, MAG, LIPASE, AMYLASE, TSH, T4, T3, CRP, SED    Anesthesia Plan    ASA Status:  3, emergent       Anesthesia Type: Spinal.              Consents            Postoperative Care            Comments:    Other Comments: Bleeding placenta previa             Ariela Sweeney

## 2022-11-25 NOTE — H&P
[facility] Labor and Delivery History and Physical    Mackenzie Nino MRN# [id number]   Age: 32 year old YOB: 1990     Date of Admission:  2022    Primary care provider: Jacques melton         Chief Complaint:   Mackenzie Nino is a 32 year old  who is 35w6d pregnant and being admitted for vaginal bleeding and contractions in the setting of known placenta previa. She was scheduled to see her midwife today for follow up US but started say overnight. Had blood spot the size of an orange on her pad on arrival.  Pregnancy otherwise uncomplicated. Has history for two term SVDs but was aware she would need a  for this pregnancy. States she has a history of iron deficiency anemia.        Pregnancy history:     OBSTETRIC HISTORY:    OB History    Para Term  AB Living   3 2 0 0 0 2   SAB IAB Ectopic Multiple Live Births   0 0 0 0 0      # Outcome Date GA Lbr Aaron/2nd Weight Sex Delivery Anes PTL Lv   3 Current            2 Para            1 Para                EDC: Estimated Date of Delivery: Dec 24, 2022    Prenatal Labs:   Lab Results   Component Value Date    AS Negative 2022    HGB 9.6 (L) 2022       Medication Prior to Admission  Medications Prior to Admission   Medication Sig Dispense Refill Last Dose     Prenatal Vit-Fe Fumarate-FA (PRENATAL PO)    2022   .        Maternal Past Medical History:   History reviewed. No pertinent past medical history.                    Family History:   This patient has no significant family history            Social History:     Social History     Tobacco Use     Smoking status: Never     Smokeless tobacco: Never   Substance Use Topics     Alcohol use: Not Currently            Review of Systems:   C: NEGATIVE for fever, chills, change in weight  E/M: NEGATIVE for ear, mouth and throat problems  R: NEGATIVE for significant cough or SOB  CV: NEGATIVE for chest pain, palpitations or peripheral edema           Physical Exam:   Vitals were reviewed    Constitutional: Awake, alert, cooperative, no apparent distress, and appears stated age.  HEENT: Normocephalic, without obvious abnormality, atramatic  Neck: Supple, symmetrical  Back: Symmetric, no costal vertebral tenderness.  Lungs: No increased work of breathing, good air exchange, clear to auscultation bilaterally, no crackles or wheezing.  Cardiovascular: Regular rate and rhythm  Abdomen: Gravid. Non-tender  Genitourinary: No urethral discharge, normal external genitalia, no hernia.  Neurologic: Awake, alert, oriented to name, place and time.  Cranial nerves II-XII are grossly intact.    Psychiatric: Normal affect, mood  Skin: No rashes, erythema, pallor, petechia or purpura.     Presentation:Cephalic  Fetal Heart Rate Tracing: Tier 1 (normal)  Tocometer: external monitor and frequency q 5 minutes                       Assessment:   Mackenzie Nino is a 35w6d pregnant female admitted with vaginal bleeding and contractions in the setting of known placenta previa.  2. Iron deficiency anemia, hgb 9.6  3. Cat 1 FHT        Plan:   Admit - see IP orders  Plan for urgent 1CD due to bleeding previa  Patient typed and crossed for 2 units  Consented for 1CD, possible blood transfusion, possible  hysterectomy  Anesthesia, NICU made aware     Dea Hubbard MD

## 2022-11-25 NOTE — PLAN OF CARE
"Pt arrives, via wheelchair, with spouse and 2 kids for assessment of vaginal bleeding. She is a known placenta previa and states this is her second ever bleed. She is 35+ week pregnant. She states she started bleeding at 0400 today. She presents with a saturated washcloth between her legs and after transferring from wheelchair to bed she has an \"orange sized\" spot of blood under her on the chux pad. She states she has been feeling \"weird things\" when asked if she's been having contractions, unclear what she is feeling. She denies feeling baby move throughout the night. After obtaining verbal consent monitors placed. +FHTs found WNL. IV placed and IVF bolus started. To contact provider for plan.  "

## 2022-11-25 NOTE — PLAN OF CARE
VSS Fundus firm, scant flow. Incision intact covered with a liquid bandage. Rating pain at a 1-2. Using toradol and tylenol for pain. Tolerating a regular diet and clear liquids. Knight catheter putting out clear yellow urine.Catheter had 70 mls out. MD called. Infant in the NICU. Pt using electric breast pump every 3 hours. Patient requesting to rest. Pt and  given folder. Will continue to monitor.

## 2022-11-26 LAB — HGB BLD-MCNC: 8.3 G/DL (ref 11.7–15.7)

## 2022-11-26 PROCEDURE — 250N000011 HC RX IP 250 OP 636: Performed by: STUDENT IN AN ORGANIZED HEALTH CARE EDUCATION/TRAINING PROGRAM

## 2022-11-26 PROCEDURE — 85018 HEMOGLOBIN: CPT | Performed by: STUDENT IN AN ORGANIZED HEALTH CARE EDUCATION/TRAINING PROGRAM

## 2022-11-26 PROCEDURE — 250N000013 HC RX MED GY IP 250 OP 250 PS 637: Performed by: STUDENT IN AN ORGANIZED HEALTH CARE EDUCATION/TRAINING PROGRAM

## 2022-11-26 PROCEDURE — 258N000003 HC RX IP 258 OP 636: Performed by: STUDENT IN AN ORGANIZED HEALTH CARE EDUCATION/TRAINING PROGRAM

## 2022-11-26 PROCEDURE — 120N000012 HC R&B POSTPARTUM

## 2022-11-26 PROCEDURE — 36415 COLL VENOUS BLD VENIPUNCTURE: CPT | Performed by: STUDENT IN AN ORGANIZED HEALTH CARE EDUCATION/TRAINING PROGRAM

## 2022-11-26 RX ADMIN — OXYCODONE HYDROCHLORIDE 5 MG: 5 TABLET ORAL at 10:28

## 2022-11-26 RX ADMIN — ACETAMINOPHEN 975 MG: 325 TABLET, FILM COATED ORAL at 21:58

## 2022-11-26 RX ADMIN — ACETAMINOPHEN 975 MG: 325 TABLET, FILM COATED ORAL at 08:05

## 2022-11-26 RX ADMIN — SENNOSIDES AND DOCUSATE SODIUM 1 TABLET: 50; 8.6 TABLET ORAL at 21:58

## 2022-11-26 RX ADMIN — IRON SUCROSE 200 MG: 20 INJECTION, SOLUTION INTRAVENOUS at 13:07

## 2022-11-26 RX ADMIN — OXYCODONE HYDROCHLORIDE 5 MG: 5 TABLET ORAL at 04:34

## 2022-11-26 RX ADMIN — SENNOSIDES AND DOCUSATE SODIUM 2 TABLET: 50; 8.6 TABLET ORAL at 08:18

## 2022-11-26 RX ADMIN — IBUPROFEN 800 MG: 400 TABLET, FILM COATED ORAL at 08:05

## 2022-11-26 RX ADMIN — IBUPROFEN 800 MG: 400 TABLET, FILM COATED ORAL at 21:58

## 2022-11-26 RX ADMIN — OXYCODONE HYDROCHLORIDE 5 MG: 5 TABLET ORAL at 15:38

## 2022-11-26 RX ADMIN — ACETAMINOPHEN 975 MG: 325 TABLET, FILM COATED ORAL at 02:19

## 2022-11-26 RX ADMIN — SENNOSIDES AND DOCUSATE SODIUM 2 TABLET: 50; 8.6 TABLET ORAL at 18:49

## 2022-11-26 RX ADMIN — OXYCODONE HYDROCHLORIDE 5 MG: 5 TABLET ORAL at 22:04

## 2022-11-26 RX ADMIN — ACETAMINOPHEN 975 MG: 325 TABLET, FILM COATED ORAL at 15:31

## 2022-11-26 RX ADMIN — IBUPROFEN 800 MG: 400 TABLET, FILM COATED ORAL at 15:32

## 2022-11-26 RX ADMIN — KETOROLAC TROMETHAMINE 30 MG: 30 INJECTION, SOLUTION INTRAMUSCULAR; INTRAVENOUS at 02:19

## 2022-11-26 ASSESSMENT — ACTIVITIES OF DAILY LIVING (ADL)
ADLS_ACUITY_SCORE: 31

## 2022-11-26 NOTE — PROGRESS NOTES
[facility]       DAILY NOTE - POSTPARTUM DAY 1     SUBJECTIVE:     Pain controlled? Yes  Tolerating a regular diet? YES  Ambulating? YES  Voiding without difficulty? No: had rodríguez removed this AM    OBJECTIVE:  Vitals:    22 0021 22 0400 22 0805   BP: 105/61 98/63 101/60 110/58   BP Location: Left arm Left arm Left arm    Patient Position: Semi-Lainez's Semi-Lainez's Supine    Cuff Size: Adult Regular Adult Regular Adult Regular Adult Regular   Pulse: 57 58 58    Resp: 14 16 14    Temp: 97.8  F (36.6  C) 98.1  F (36.7  C) 98.4  F (36.9  C) 98  F (36.7  C)   TempSrc: Oral Oral Oral Oral   SpO2:       Weight:       Height:           Constitutional: healthy, alert and no distress    Abdomen:  Uterine fundus is firm, non-tender and at the level of the umbilicus     Incision: Healing well, well approximated, without signs of infection and no drainage      LABS:  Hemoglobin   Date Value Ref Range Status   2022 8.3 (L) 11.7 - 15.7 g/dL Final   2022 9.6 (L) 11.7 - 15.7 g/dL Final       ASSESSMENT:  Post-partum day #1 s/p primary  Section  Pregnancy complicated by complete placenta previa, anemia    Doing well. Baby doing well in NICU       PLAN:   Continue routine postpartum cares  IV venofer for asymptomatic anemia    Dea Hubbard MD

## 2022-11-26 NOTE — PLAN OF CARE
Vital signs stable. Postpartum assessment WDL. Incision liquid bandage. Pain controlled with  Toradol  and Tylenol. Patient ambulating with one assist  Patient denies passing gas.  Patient is  pumping baby is in NICU Patient  went to NICU via wheel chair. She did stand up and was dizzy but felt better once she stood in NICU. Bolus was given because of low urine output. Good urine output via rodríguez.

## 2022-11-26 NOTE — LACTATION NOTE
Lactation check-in with Mackenzie. 35+6 week infant in the NICU, so She's been utilizing the breastpump since delivery yesterday and yielding measurable EBM. She feels as though the flange size is appropriate and denies needing any help with sizing. Review the use of the initiation and maintenance mode and encourage transitioning to maintenance mode once consistently expressing 20+ml. Encouraged to pump 8-10x/day and to begin bringing infant to chest for skin to skin and breastfeeding attempts once she's showing interest.    Will continue to follow.    Sury Ko RN,IBCLC

## 2022-11-26 NOTE — PLAN OF CARE
Vss, RA.  LS clear. UO adequate.  Knight removed at 0400.  Tylenol, toradol, and oxycodone controlling pain.  Incision intact with liquid bandage.  Pumping.  UA + for cannabis. IV SL.

## 2022-11-27 PROCEDURE — 250N000011 HC RX IP 250 OP 636: Performed by: STUDENT IN AN ORGANIZED HEALTH CARE EDUCATION/TRAINING PROGRAM

## 2022-11-27 PROCEDURE — 258N000003 HC RX IP 258 OP 636: Performed by: STUDENT IN AN ORGANIZED HEALTH CARE EDUCATION/TRAINING PROGRAM

## 2022-11-27 PROCEDURE — 250N000013 HC RX MED GY IP 250 OP 250 PS 637: Performed by: STUDENT IN AN ORGANIZED HEALTH CARE EDUCATION/TRAINING PROGRAM

## 2022-11-27 PROCEDURE — 120N000012 HC R&B POSTPARTUM

## 2022-11-27 RX ADMIN — OXYCODONE HYDROCHLORIDE 5 MG: 5 TABLET ORAL at 03:39

## 2022-11-27 RX ADMIN — OXYCODONE HYDROCHLORIDE 5 MG: 5 TABLET ORAL at 12:48

## 2022-11-27 RX ADMIN — IBUPROFEN 800 MG: 400 TABLET, FILM COATED ORAL at 16:30

## 2022-11-27 RX ADMIN — ACETAMINOPHEN 975 MG: 325 TABLET, FILM COATED ORAL at 22:31

## 2022-11-27 RX ADMIN — IRON SUCROSE 200 MG: 20 INJECTION, SOLUTION INTRAVENOUS at 10:08

## 2022-11-27 RX ADMIN — ACETAMINOPHEN 975 MG: 325 TABLET, FILM COATED ORAL at 03:39

## 2022-11-27 RX ADMIN — SENNOSIDES AND DOCUSATE SODIUM 2 TABLET: 50; 8.6 TABLET ORAL at 08:43

## 2022-11-27 RX ADMIN — IBUPROFEN 800 MG: 400 TABLET, FILM COATED ORAL at 10:08

## 2022-11-27 RX ADMIN — IBUPROFEN 800 MG: 400 TABLET, FILM COATED ORAL at 03:39

## 2022-11-27 RX ADMIN — SENNOSIDES AND DOCUSATE SODIUM 2 TABLET: 50; 8.6 TABLET ORAL at 22:31

## 2022-11-27 RX ADMIN — OXYCODONE HYDROCHLORIDE 5 MG: 5 TABLET ORAL at 08:43

## 2022-11-27 RX ADMIN — OXYCODONE HYDROCHLORIDE 5 MG: 5 TABLET ORAL at 21:40

## 2022-11-27 RX ADMIN — ACETAMINOPHEN 975 MG: 325 TABLET, FILM COATED ORAL at 10:08

## 2022-11-27 RX ADMIN — ACETAMINOPHEN 975 MG: 325 TABLET, FILM COATED ORAL at 16:30

## 2022-11-27 RX ADMIN — IBUPROFEN 800 MG: 400 TABLET, FILM COATED ORAL at 22:31

## 2022-11-27 ASSESSMENT — ACTIVITIES OF DAILY LIVING (ADL)
ADLS_ACUITY_SCORE: 31

## 2022-11-27 NOTE — PROGRESS NOTES
SW:  D: SW filed CPS report due to the patient having a positive toxicology report. The CPS  ruled out the case due to the results of the baby's not being back yet. The baby will be able to discharge when medically stable.     ADONAY will continue to follow.     BLANCA Eaton

## 2022-11-27 NOTE — PLAN OF CARE
Vital signs stable. Postpartum assessment WDL. Incision JONNY .  Pain controlled with Tylenol , Ibuprofen and Oxycodone. Patient ambulating with SBA  Patient is  passing gas.  Pumping every 3 to 4 hours baby is in NICU. Patient is visiting baby in NICU and mother is bonding well. Will continue with current plan of care.

## 2022-11-27 NOTE — PROGRESS NOTES
Significant other had walked on the floor with three children. 4,7 and 12. I entered the room to see if another adult was in the room to watch the children. The father was talking to the 12 year old as I entered and was explaining to the 12 year old to watch the others. I reinforced the discussion I had with  Significant other , Alyssa and Mackenzie about the children visitors policy yesterday. The 4 year old is too young to visit and also another adult has to be in the room if Alyssa  leaves the room to visit NICU. Mackenzie can't be left with the other children alone. Mackenzie at this time was visiting her baby in NICU.

## 2022-11-27 NOTE — LACTATION NOTE
Lactation check-in. Mackenzie initiating pumping at time of visit; she feels as though her breasts are filling and her last pumping session was at 4am. Encourage her to pump 8x/day and discuss that as her milk comes in, she likely won't want to go more than 4 hours between pumping sessions overnight. Utilizing the maintenance pumping mode and expressing 30+ml. She's hopeful infant will show more interest in latching in the next day or two; planning to attempt breastfeeding later today. Review that lactation support is available for duration of time in NICU, should she be interested in further assistance.    Sury Ko RN, IBCLC

## 2022-11-27 NOTE — CONSULTS
[facility]  MATERNAL CHILD HEALTH   INITIAL NICU PSYCHOSOCIAL ASSESSMENT      DATA:      Reason for Social Work Consult: NICU     Presenting Information: Pt is Светлана, born on  at 35w 6d gestation and admitted to the NICU on  for prematurity and problem feeding in . Parents are Mackenzie and Alma. ADONAY met with Mackenzie today to introduce self/role, perform assessment, and offer ongoing resource support.     Living Situation: SAM has 2 other children and has 50/50 custody with their father. The FOB has a child with part time custody.      Social Support: SAM reports she has a good support system.     Education and Employment: SAM works as a . FOB is a      Insurance: SAM has [employer]     Source of Financial Support: SAM and FOB are both employed      Mental Health History: none     History of Postpartum Mood Disorders: none     Chemical Health History: toxicology report was positive for cannabinoids      Current Coping: no concerns     Community Resources//Baby Supplies:  SAM is asking for resources for WIC and free car seat     INTERVENTION:        ADONAY completed chart review and collaborated with the multidisciplinary team.     Psychosocial Assessment     Introduction to Maternal Child Health  role and scope of practice     Reviewed Hospital and Community Resources     Assessed Chemical Health History and Current Symptoms     Assessed Mental Health History and Current Symptoms     Identified stressors, barriers and family concerns     Provided supportive counseling. Active empathetic listening and validation.     Provided psychoeducation on  mood and anxiety disorders, assessed for any current symptoms or history     ASSESSMENT:      Coping: adequate     Affect: appropriate, bright     Mood: calm     Motivation/Ability to Access Services: Highly motivated, independent in accessing services     Assessment of Support System:  stable     Level of engagement with SW: They appeared open to and appreciative of ongoing therapeutic support, advocacy, and connection with resources.  Engaged and appropriate. Able to seek out SW when needs arise.      Family s understanding of baby s medical situation: appropriate understanding     Family and parent/infant interactions: not witnessed      Assessment of parental risk for PMAD:   Higher than average risk given  unexpected NICU admission     Strengths: willingness to accept help     Vulnerabilities:  none  Identified Barriers:  None at this time      PLAN:      SW will continue to follow throughout pt's Maternal-Child Health Journey as needs arise. SW will continue to collaborate with the multidisciplinary team. Planned follow-up  weekly.     BLANCA Eaton

## 2022-11-27 NOTE — PLAN OF CARE
Vital signs stable. Postpartum assessment WDL. Incision JONNY. Pain controlled with Tylenol, Ibuprofen and occasional Patient ambulating Independent. Patient is passing gas.  Pumping  every 3 hours  baby is NICU.  Patient and infant bonding well. Will continue with current plan of care.  Patient is walking independently in the room and to NICU.

## 2022-11-27 NOTE — PROGRESS NOTES
[facility]       DAILY NOTE - POSTPARTUM DAY 2     SUBJECTIVE:     Pain controlled? Yes  Tolerating a regular diet? YES  Ambulating? YES  Voiding without difficulty? Yes    OBJECTIVE:  Vitals:    22 1325 22 1538 22 1930 22 0840   BP: 120/70  117/67 111/58   BP Location:   Left arm Left arm   Patient Position:   Semi-Lainez's    Cuff Size: Adult Regular  Adult Regular    Pulse: 67  83 55   Resp:  16 16 16   Temp: 98  F (36.7  C)  98.5  F (36.9  C) 98.2  F (36.8  C)   TempSrc: Oral  Oral Oral   SpO2:       Weight:       Height:           Constitutional: healthy, alert and no distress    Abdomen:  Uterine fundus is firm, non-tender and at the level of the umbilicus     Incision: Healing well      LABS:  Hemoglobin   Date Value Ref Range Status   2022 8.3 (L) 11.7 - 15.7 g/dL Final   2022 9.6 (L) 11.7 - 15.7 g/dL Final       ASSESSMENT:  Post-partum day #2 s/p  Section  Pregnancy complicated by placenta previa and acute hemorrhagic anemia    Doing well.  Clean wound without signs of infection.  No immediate surgical complications identified.       PLAN:   Continue routine postpartum cares    Torito Fitzgerald MD

## 2022-11-28 PROCEDURE — 250N000013 HC RX MED GY IP 250 OP 250 PS 637: Performed by: STUDENT IN AN ORGANIZED HEALTH CARE EDUCATION/TRAINING PROGRAM

## 2022-11-28 PROCEDURE — 120N000012 HC R&B POSTPARTUM

## 2022-11-28 RX ADMIN — ACETAMINOPHEN 975 MG: 325 TABLET, FILM COATED ORAL at 04:24

## 2022-11-28 RX ADMIN — OXYCODONE HYDROCHLORIDE 5 MG: 5 TABLET ORAL at 07:12

## 2022-11-28 RX ADMIN — OXYCODONE HYDROCHLORIDE 5 MG: 5 TABLET ORAL at 13:37

## 2022-11-28 RX ADMIN — ACETAMINOPHEN 975 MG: 325 TABLET, FILM COATED ORAL at 22:10

## 2022-11-28 RX ADMIN — SENNOSIDES AND DOCUSATE SODIUM 1 TABLET: 50; 8.6 TABLET ORAL at 07:40

## 2022-11-28 RX ADMIN — ACETAMINOPHEN 975 MG: 325 TABLET, FILM COATED ORAL at 10:11

## 2022-11-28 RX ADMIN — IBUPROFEN 800 MG: 400 TABLET, FILM COATED ORAL at 10:11

## 2022-11-28 RX ADMIN — SENNOSIDES AND DOCUSATE SODIUM 1 TABLET: 50; 8.6 TABLET ORAL at 22:11

## 2022-11-28 RX ADMIN — IBUPROFEN 800 MG: 400 TABLET, FILM COATED ORAL at 16:08

## 2022-11-28 RX ADMIN — IBUPROFEN 800 MG: 400 TABLET, FILM COATED ORAL at 04:24

## 2022-11-28 RX ADMIN — IBUPROFEN 800 MG: 400 TABLET, FILM COATED ORAL at 22:11

## 2022-11-28 RX ADMIN — ACETAMINOPHEN 975 MG: 325 TABLET, FILM COATED ORAL at 16:08

## 2022-11-28 ASSESSMENT — ACTIVITIES OF DAILY LIVING (ADL)
ADLS_ACUITY_SCORE: 31

## 2022-11-28 NOTE — PROGRESS NOTES
[facility]       DAILY NOTE - POSTPARTUM DAY 3     SUBJECTIVE:     Pain controlled? No: insisting on staying another day due to pain  Tolerating a regular diet? YES  Ambulating? minimally due to pain  Voiding without difficulty? Yes    OBJECTIVE:  Vitals:    22 1248 22 1630 22 2250 22 0744   BP:  112/60 104/57 114/71   BP Location:   Left arm Left arm   Patient Position:    Semi-Lainez's   Cuff Size:    Adult Regular   Pulse:  60 86 67   Resp: 16 16 16 16   Temp:  98  F (36.7  C) 98.5  F (36.9  C) 97.8  F (36.6  C)   TempSrc:  Oral Oral Axillary   SpO2:       Weight:       Height:           Constitutional: healthy, alert and no distress    Abdomen:  Uterine fundus is firm, non-tender and at the level of the umbilicus     Incision: Healing well      LABS:  Hemoglobin   Date Value Ref Range Status   2022 8.3 (L) 11.7 - 15.7 g/dL Final   2022 9.6 (L) 11.7 - 15.7 g/dL Final       ASSESSMENT:  Post-partum day #3 s/p  Section  Pregnancy complicated by placenta previa    Doing well.       PLAN:   Continue routine postpartum cares  Anticipate discharge tomorrow    Torin Quinn MD

## 2022-11-28 NOTE — PLAN OF CARE
Patient's pain was managed by Tylenol, Ibuprofen, and Oxycodone. Infant remains in NICU, parents visited. Ambulating independently in the room and hallway. Needs reminders to pump. Voiding without difficulty. Wears abdominal binder. Plans to discharge tomorrow.

## 2022-11-28 NOTE — PLAN OF CARE
Vital signs stable. Voiding without difficulty. Lung sounds clear and equal. Using oxycodone, ibuprofen and tylenol for pain management. Heat packs in room for cramping. Up ambulating free of dizziness. Encouraging to pump every 3 hours. Encouraged to call with questions/concerns. Will continue to monitor.

## 2022-11-29 VITALS
HEART RATE: 63 BPM | HEIGHT: 61 IN | RESPIRATION RATE: 16 BRPM | OXYGEN SATURATION: 100 % | BODY MASS INDEX: 21.71 KG/M2 | TEMPERATURE: 98.2 F | WEIGHT: 115 LBS | SYSTOLIC BLOOD PRESSURE: 126 MMHG | DIASTOLIC BLOOD PRESSURE: 81 MMHG

## 2022-11-29 PROCEDURE — 250N000013 HC RX MED GY IP 250 OP 250 PS 637: Performed by: STUDENT IN AN ORGANIZED HEALTH CARE EDUCATION/TRAINING PROGRAM

## 2022-11-29 RX ORDER — OXYCODONE HYDROCHLORIDE 5 MG/1
5 TABLET ORAL EVERY 4 HOURS PRN
Qty: 20 TABLET | Refills: 0 | Status: SHIPPED | OUTPATIENT
Start: 2022-11-29

## 2022-11-29 RX ORDER — IBUPROFEN 800 MG/1
800 TABLET, FILM COATED ORAL EVERY 6 HOURS
Qty: 30 TABLET | Refills: 0 | Status: SHIPPED | OUTPATIENT
Start: 2022-11-29

## 2022-11-29 RX ADMIN — ACETAMINOPHEN 975 MG: 325 TABLET, FILM COATED ORAL at 04:01

## 2022-11-29 RX ADMIN — IBUPROFEN 800 MG: 400 TABLET, FILM COATED ORAL at 10:28

## 2022-11-29 RX ADMIN — OXYCODONE HYDROCHLORIDE 5 MG: 5 TABLET ORAL at 10:28

## 2022-11-29 RX ADMIN — OXYCODONE HYDROCHLORIDE 5 MG: 5 TABLET ORAL at 01:04

## 2022-11-29 RX ADMIN — IBUPROFEN 800 MG: 400 TABLET, FILM COATED ORAL at 04:01

## 2022-11-29 RX ADMIN — SENNOSIDES AND DOCUSATE SODIUM 2 TABLET: 50; 8.6 TABLET ORAL at 10:28

## 2022-11-29 RX ADMIN — ACETAMINOPHEN 975 MG: 325 TABLET, FILM COATED ORAL at 10:28

## 2022-11-29 ASSESSMENT — ACTIVITIES OF DAILY LIVING (ADL)
WALKING_OR_CLIMBING_STAIRS_DIFFICULTY: NO
DOING_ERRANDS_INDEPENDENTLY_DIFFICULTY: NO
ADLS_ACUITY_SCORE: 18
CHANGE_IN_FUNCTIONAL_STATUS_SINCE_ONSET_OF_CURRENT_ILLNESS/INJURY: NO
DIFFICULTY_EATING/SWALLOWING: NO
ADLS_ACUITY_SCORE: 18
ADLS_ACUITY_SCORE: 31
DRESSING/BATHING_DIFFICULTY: NO
ADLS_ACUITY_SCORE: 31
CONCENTRATING,_REMEMBERING_OR_MAKING_DECISIONS_DIFFICULTY: NO
WEAR_GLASSES_OR_BLIND: NO
TOILETING_ISSUES: NO
ADLS_ACUITY_SCORE: 31
FALL_HISTORY_WITHIN_LAST_SIX_MONTHS: NO
ADLS_ACUITY_SCORE: 18

## 2022-11-29 NOTE — PROGRESS NOTES
[facility]       DAILY NOTE - POSTPARTUM DAY 4     SUBJECTIVE:     Pain controlled? Yes  Tolerating a regular diet? YES  Ambulating? YES  Voiding without difficulty? Yes    OBJECTIVE:  Vitals:    22 0744 22 1011 22 1555 22 0104   BP: 114/71 112/64 112/65 120/64   BP Location: Left arm Left arm Left arm Left arm   Patient Position: Semi-Lainez's Semi-Lainez's     Cuff Size: Adult Regular Adult Regular  Adult Regular   Pulse: 67  62 60   Resp: 16  16 16   Temp: 97.8  F (36.6  C)  98.5  F (36.9  C) 98.2  F (36.8  C)   TempSrc: Axillary  Oral Oral   SpO2:       Weight:       Height:           Constitutional: healthy, alert and no distress    Abdomen:  Uterine fundus is firm, non-tender and at the level of the umbilicus     Incision: Healing well      LABS:  Hemoglobin   Date Value Ref Range Status   2022 8.3 (L) 11.7 - 15.7 g/dL Final   2022 9.6 (L) 11.7 - 15.7 g/dL Final       ASSESSMENT:  Post-partum day #4 s/p  Section  Pregnancy complicated by placenta previa    Doing well.       PLAN:   Discharge today.  Return to clinic in 2 and 6 weeks.  Continue routine postpartum cares    Torin Quinn MD

## 2022-11-29 NOTE — PLAN OF CARE
VSS, on room air. Taking scheduled tylenol and ibuprofen for pain control did not need any oxycodone this evening. Showered this evening. Pumping for baby and bringing it to baby in NICU.

## 2022-11-29 NOTE — PLAN OF CARE
VSS. Pain controlled well with Ibuprofen, Oxycodone and Tylenol. Pumping 25ml of breast milk every 3 hours and ambulating well down to NICU. Questions answered. Will continue to monitor.

## 2022-11-30 LAB
CANNABINOIDS UR CFM-MCNC: 1828 NG/ML
CARBOXYTHC/CREAT UR: 1027 NG/MG CREAT

## 2022-12-28 NOTE — DISCHARGE SUMMARY
[facility] Discharge Summary    Mackenzie Nino MRN# [id number]   Age: 32 year old YOB: 1990     Date of Admission:  2022  Date of Discharge::  2022 11:56 AM   Admitting Physician:  Dea Hubbard MD  Discharge Physician:  Dea Hubbard MD     Home clinic: [facility]          Admission Diagnoses:   Vaginal bleeding  Placenta previa           Discharge Diagnosis:   Same, delivered            Procedures:     Procedure(s): Primary low transverse  section              Medications Prior to Admission:     No medications prior to admission.             Discharge Medications:     Discharge Medication List as of 2022 12:11 PM      START taking these medications    Details   ibuprofen (ADVIL/MOTRIN) 800 MG tablet Take 1 tablet (800 mg) by mouth every 6 hours, Disp-30 tablet, R-0, Local Print      oxyCODONE (ROXICODONE) 5 MG tablet Take 1 tablet (5 mg) by mouth every 4 hours as needed for pain, Disp-20 tablet, R-0, Local Print         CONTINUE these medications which have NOT CHANGED    Details   Prenatal Vit-Fe Fumarate-FA (PRENATAL PO) Take 1 tablet by mouth daily, Historical                   Consultations:   No consultations were requested during this admission          Brief History of Labor:   Patient is a  at 35w6d who was admitted for vaginal bleeding and contractions in context of known placenta previa.  Baby is known to be in the cephalic position based on Leopold's maneuvers. Risks, benefits, and alternatives to the procedure were discussed and all questions were answered to the patient's satisfaction. Consent was reviewed and signed with a witness present. NICU team was called and present for delivery due to  status.           Hospital Course:   The patient's postpartum hospital course was unremarkable.  She recovered as anticipated and experienced no post-operative complications.  Upon discharge, her pain was well controlled. Vaginal bleeding is  mild to moderate.  Voiding without difficulty.  Ambulating well and tolerating a normal diet. She was given IV iron to treat asymptomatic anemia. No fever or significant wound drainage. Infant is stable.  She was discharged on post-partum day #4.    Post-partum hemoglobin:   Hemoglobin   Date Value Ref Range Status   11/26/2022 8.3 (L) 11.7 - 15.7 g/dL Final             Discharge Instructions and Follow-Up:     Discharge diet: Regular   Discharge activity: Activity as tolerated, no lifting greater than 10 lbs   Discharge follow-up: Follow up with consultant in 2 and 6 weeks for post partum visit   Wound care: Keep wound clean and dry           Discharge Disposition:     Discharged to home      Attestation:  I have reviewed today's vital signs, notes, medications, labs and imaging.  Amount of time performed on this discharge summary: 10 minutes.    Dea Hubbard MD

## 2023-02-10 ENCOUNTER — HOSPITAL ENCOUNTER (EMERGENCY)
Facility: CLINIC | Age: 33
Discharge: HOME | End: 2023-02-10
Payer: COMMERCIAL

## 2023-02-10 VITALS
RESPIRATION RATE: 16 BRPM | DIASTOLIC BLOOD PRESSURE: 42 MMHG | SYSTOLIC BLOOD PRESSURE: 177 MMHG | TEMPERATURE: 98.8 F | HEART RATE: 70 BPM | HEIGHT: 62 IN | BODY MASS INDEX: 19.32 KG/M2 | WEIGHT: 105 LBS | OXYGEN SATURATION: 100 %

## 2023-02-10 ASSESSMENT — ACTIVITIES OF DAILY LIVING (ADL)
ADLS_ACUITY_SCORE: 33
ADLS_ACUITY_SCORE: 33

## 2023-02-10 NOTE — ED TRIAGE NOTES
Left middle finger nail removed with acrylic nail. Red, no bleeding. Pain 10/10.     Triage Assessment     Row Name 02/10/23 4082       Triage Assessment (Adult)    Airway WDL WDL       Respiratory WDL    Respiratory WDL WDL       Skin Circulation/Temperature WDL    Skin Circulation/Temperature WDL WDL       Cardiac WDL    Cardiac WDL WDL       Peripheral/Neurovascular WDL    Peripheral Neurovascular WDL WDL       Cognitive/Neuro/Behavioral WDL    Cognitive/Neuro/Behavioral WDL WDL

## (undated) DEVICE — PAD CHUX UNDERPAD 23X24" 7136

## (undated) DEVICE — ESU GROUND PAD UNIVERSAL W/O CORD

## (undated) DEVICE — SU VICRYL 0 CT 36" J358H

## (undated) DEVICE — LIGHT HANDLE X2

## (undated) DEVICE — SUCTION CANISTER MEDIVAC LINER 3000ML W/LID 65651-530

## (undated) DEVICE — SOL WATER IRRIG 1000ML BOTTLE 2F7114

## (undated) DEVICE — BLADE CLIPPER 4406

## (undated) DEVICE — SOL NACL 0.9% IRRIG 1000ML BOTTLE 2F7124

## (undated) DEVICE — LINEN TOWEL PACK X5 5464

## (undated) DEVICE — STPL SKIN 35W 6.9MM  PXW35

## (undated) DEVICE — DRAPE SHEET REV FOLD 3/4 9349

## (undated) DEVICE — DRSG KERLIX FLUFFS X5

## (undated) DEVICE — LINEN BABY BLANKET 5434

## (undated) DEVICE — PACK SET-UP STD 9102

## (undated) DEVICE — PREP CHLORAPREP 26ML TINTED HI-LITE ORANGE 930815

## (undated) DEVICE — PACK C-SECTION LF PL15OTA83B

## (undated) RX ORDER — FENTANYL CITRATE 50 UG/ML
INJECTION, SOLUTION INTRAMUSCULAR; INTRAVENOUS
Status: DISPENSED
Start: 2022-11-25

## (undated) RX ORDER — PROPOFOL 10 MG/ML
INJECTION, EMULSION INTRAVENOUS
Status: DISPENSED
Start: 2022-11-25

## (undated) RX ORDER — MORPHINE SULFATE 1 MG/ML
INJECTION, SOLUTION EPIDURAL; INTRATHECAL; INTRAVENOUS
Status: DISPENSED
Start: 2022-11-25